# Patient Record
Sex: FEMALE | NOT HISPANIC OR LATINO | URBAN - METROPOLITAN AREA
[De-identification: names, ages, dates, MRNs, and addresses within clinical notes are randomized per-mention and may not be internally consistent; named-entity substitution may affect disease eponyms.]

---

## 2023-01-13 ENCOUNTER — APPOINTMENT (RX ONLY)
Dept: URBAN - METROPOLITAN AREA CLINIC 55 | Facility: CLINIC | Age: 53
Setting detail: DERMATOLOGY
End: 2023-01-13

## 2023-01-13 DIAGNOSIS — Z41.9 ENCOUNTER FOR PROCEDURE FOR PURPOSES OTHER THAN REMEDYING HEALTH STATE, UNSPECIFIED: ICD-10-CM

## 2023-01-13 PROCEDURE — ? IN-HOUSE DISPENSING PHARMACY

## 2023-01-13 PROCEDURE — ? BOTOX

## 2023-01-13 PROCEDURE — ? COSMETIC CONSULTATION: GENERAL

## 2023-01-13 PROCEDURE — ? INVENTORY

## 2023-01-13 NOTE — PROCEDURE: IN-HOUSE DISPENSING PHARMACY
Product 2 Unit Type: ml
Product 59 Price/Unit (In Dollars): 0
Product 7 Refills: 2
Product 3 Amount/Unit (Numbers Only): 30
Product 10 Unit Type: mg
Name Of Product 4: Hydroquinone 8% Emulsion
Name Of Product 3: Acne Triple Combination Gel
Name Of Product 1: Anti-Aging Brightening Cream
Send Charges To Patient Encounter: No
Name Of Product 7: Lidocaine 23% / Tetracaine 7% Cream
Name Of Product 2: Dapsone / Spironolactone Gel
Name Of Product 5: Hydrating Tretinoin 0.025% Cream
Detail Level: Zone
Product 4 Application Directions: Apply nightly or as directed.
Product 1 Application Directions: Apply nightly or as directed. Use SPF daily.
Product 6 Refills: 11
Product 7 Application Directions: Apply only as directed
Render Product Pricing In Note: Yes
Name Of Product 6: Rosacea Triple Combination Gel
Product 7 Units Dispensed: 1

## 2023-01-13 NOTE — PROCEDURE: BOTOX
Show Ucl Units: No
Consent: Verbal consent obtained. Risks include but not limited to lid/brow ptosis, bruising, swelling, diplopia, temporary effect, incomplete chemical denervation.
Show Additional Area 6: Yes
Left Pupillary Line Units: 0
Show Inventory Tab: Show
Additional Area 2 Location: chin
Detail Level: Detailed
Glabellar Complex Units: 12
Additional Area 1 Location: upper lip
Forehead Units: 8
Dilution (U/0.1 Cc): 4
Post-Care Instructions: Patient instructed to not lie down for 4 hours and limit physical activity for 24 hours.

## 2023-01-24 ENCOUNTER — APPOINTMENT (RX ONLY)
Dept: URBAN - METROPOLITAN AREA CLINIC 55 | Facility: CLINIC | Age: 53
Setting detail: DERMATOLOGY
End: 2023-01-24

## 2023-01-24 DIAGNOSIS — Z41.9 ENCOUNTER FOR PROCEDURE FOR PURPOSES OTHER THAN REMEDYING HEALTH STATE, UNSPECIFIED: ICD-10-CM

## 2023-01-24 PROCEDURE — ? ULTHERAPY

## 2023-01-24 PROCEDURE — ? INVENTORY

## 2023-01-24 PROCEDURE — ? BOTOX

## 2023-01-24 NOTE — PROCEDURE: ULTHERAPY
Treatment Number (Optional): 1
Anesthesia Type: 1% lidocaine with epinephrine
Post-Procedures Photographs: Yes
Use Distraction Techniques In Note: No
Detail Level: Zone
Consents obtained, risks reviewed.
Post-Care Instructions: Physical sunscreen applied post treatment.\\nI reviewed with the patient in detail post-care instructions.
Patient Reported Discomfort: 0
Total Lines (Use Numbers Only, No Special Characters Or $): 190

## 2023-01-24 NOTE — PROCEDURE: BOTOX
Additional Area 1 Units: 0
Show Additional Area 6: Yes
Show Ucl Units: No
Consent: Verbal consent obtained. Risks include but not limited to lid/brow ptosis, bruising, swelling, diplopia, temporary effect, incomplete chemical denervation.
Glabellar Complex Units: 8
Additional Area 2 Location: chin
Dilution (U/0.1 Cc): 4
Post-Care Instructions: Patient instructed to not lie down for 4 hours and limit physical activity for 24 hours.
Incrementing Botox Units: By 0.5 Units
Detail Level: Detailed
Additional Area 1 Location: upper lip
Show Inventory Tab: Show

## 2023-07-24 ENCOUNTER — APPOINTMENT (RX ONLY)
Dept: URBAN - METROPOLITAN AREA CLINIC 55 | Facility: CLINIC | Age: 53
Setting detail: DERMATOLOGY
End: 2023-07-24

## 2023-07-24 DIAGNOSIS — Z41.9 ENCOUNTER FOR PROCEDURE FOR PURPOSES OTHER THAN REMEDYING HEALTH STATE, UNSPECIFIED: ICD-10-CM

## 2023-07-24 PROCEDURE — ? INVENTORY

## 2023-07-24 PROCEDURE — ? BOTOX

## 2023-07-24 PROCEDURE — ? COSMETIC FOLLOW-UP

## 2023-07-24 NOTE — PROCEDURE: BOTOX
Nasal Root Units: 0
Show Levator Superior Units: Yes
Show Ucl Units: No
Additional Area 1 Location: upper lip
Incrementing Botox Units: By 0.5 Units
Consent: Verbal consent obtained. Risks include but not limited to lid/brow ptosis, bruising, swelling, diplopia, temporary effect, incomplete chemical denervation.
Detail Level: Detailed
Dilution (U/0.1 Cc): 4
Post-Care Instructions: Patient instructed to not lie down for 4 hours and limit physical activity for 24 hours.
Forehead Units: 8
Additional Area 3 Location: lower lip
Glabellar Complex Units: 20
Show Inventory Tab: Show
Additional Area 2 Location: chin
Periorbital Skin Units: 12

## 2023-07-24 NOTE — PROCEDURE: COSMETIC FOLLOW-UP
Detail Level: Zone
Comments (Free Text): Post periorbital Ultherapy photos obtained from treatment on 1/24/23. Before and after photos reviewed with patient. She is pleased with her results at this time. Patient reports she has started topical tretinoin since her LOV. Her plan to maintain results is to continue her tretinoin and Botox; no additional intervention discussed at this time.

## 2024-01-25 ENCOUNTER — APPOINTMENT (RX ONLY)
Dept: URBAN - METROPOLITAN AREA CLINIC 55 | Facility: CLINIC | Age: 54
Setting detail: DERMATOLOGY
End: 2024-01-25

## 2024-01-25 DIAGNOSIS — Z41.9 ENCOUNTER FOR PROCEDURE FOR PURPOSES OTHER THAN REMEDYING HEALTH STATE, UNSPECIFIED: ICD-10-CM

## 2024-01-25 PROCEDURE — ? BOTOX

## 2024-01-25 NOTE — PROCEDURE: BOTOX
Additional Area 1 Location: upper lip
Additional Area 5 Units: 0
Show Right And Left Pupillary Line Units: No
Incrementing Botox Units: By 0.5 Units
Show Anterior Platysmal Band Units: Yes
Consent: Verbal consent obtained. Risks include but not limited to lid/brow ptosis, bruising, swelling, diplopia, temporary effect, incomplete chemical denervation.
Detail Level: Detailed
Post-Care Instructions: Patient instructed to not lie down for 4 hours and limit physical activity for 24 hours.
Dilution (U/0.1 Cc): 4
Forehead Units: 8
Show Inventory Tab: Show
Glabellar Complex Units: 16
Additional Area 3 Location: lower lip
Additional Area 2 Location: chin

## 2024-07-23 ENCOUNTER — APPOINTMENT (RX ONLY)
Dept: URBAN - METROPOLITAN AREA CLINIC 55 | Facility: CLINIC | Age: 54
Setting detail: DERMATOLOGY
End: 2024-07-23

## 2024-07-23 DIAGNOSIS — Z41.9 ENCOUNTER FOR PROCEDURE FOR PURPOSES OTHER THAN REMEDYING HEALTH STATE, UNSPECIFIED: ICD-10-CM

## 2024-07-23 PROCEDURE — ? BOTOX

## 2024-07-23 NOTE — PROCEDURE: BOTOX
Additional Area 6 Units: 0
Show Additional Area 3: Yes
Show Mentalis Units: No
Glabellar Complex Units: 16
Show Inventory Tab: Show
Additional Area 3 Location: lower lip
Additional Area 2 Location: chin
Consent: Verbal consent obtained. Risks include but not limited to lid/brow ptosis, bruising, swelling, diplopia, temporary effect, incomplete chemical denervation.
Detail Level: Detailed
Post-Care Instructions: Patient instructed to not lie down for 4 hours and limit physical activity for 24 hours.
Incrementing Botox Units: By 0.5 Units
Additional Area 1 Location: upper lip
Dilution (U/0.1 Cc): 4
Forehead Units: 8

## 2024-12-06 ENCOUNTER — APPOINTMENT (OUTPATIENT)
Dept: URBAN - METROPOLITAN AREA CLINIC 55 | Facility: CLINIC | Age: 54
Setting detail: DERMATOLOGY
End: 2024-12-06

## 2024-12-06 DIAGNOSIS — Z41.9 ENCOUNTER FOR PROCEDURE FOR PURPOSES OTHER THAN REMEDYING HEALTH STATE, UNSPECIFIED: ICD-10-CM

## 2024-12-06 PROCEDURE — ? BOTOX

## 2024-12-06 NOTE — PROCEDURE: BOTOX
Right Periorbital Skin Units: 0
Show Glabellar Units: Yes
Show Right And Left Brow Units: No
Post-Care Instructions: Patient instructed to not lie down for 4 hours and limit physical activity for 24 hours.
Additional Area 2 Location: chin
Detail Level: Detailed
Incrementing Botox Units: By 0.5 Units
Additional Area 1 Location: upper lip
Periorbital Skin Units: 16
Dilution (U/0.1 Cc): 4
Show Inventory Tab: Show
Forehead Units: 8
Additional Area 3 Location: lower lip
Consent: Verbal consent obtained. Risks include but not limited to lid/brow ptosis, bruising, swelling, diplopia, temporary effect, incomplete chemical denervation.

## 2024-12-12 ENCOUNTER — APPOINTMENT (OUTPATIENT)
Dept: URBAN - METROPOLITAN AREA CLINIC 55 | Facility: CLINIC | Age: 54
Setting detail: DERMATOLOGY
End: 2024-12-12

## 2024-12-12 DIAGNOSIS — Z41.9 ENCOUNTER FOR PROCEDURE FOR PURPOSES OTHER THAN REMEDYING HEALTH STATE, UNSPECIFIED: ICD-10-CM

## 2024-12-12 PROCEDURE — ? BOTOX

## 2024-12-12 NOTE — PROCEDURE: BOTOX
Depressor Anguli Oris Units: 0
Show Orbicularis Oculi Units: Yes
Dilution (U/0.1 Cc): 4
Additional Area 2 Location: chin
Periorbital Skin Units: 16
Detail Level: Detailed
Show Mentalis Units: No
Incrementing Botox Units: By 0.5 Units
Additional Area 1 Location: upper lip
Consent: Verbal consent obtained. Risks include but not limited to lid/brow ptosis, bruising, swelling, diplopia, temporary effect, incomplete chemical denervation.
Show Inventory Tab: Show
Post-Care Instructions: Patient instructed to not lie down for 4 hours and limit physical activity for 24 hours.
Forehead Units: 8
Additional Area 3 Location: lower lip